# Patient Record
Sex: FEMALE
[De-identification: names, ages, dates, MRNs, and addresses within clinical notes are randomized per-mention and may not be internally consistent; named-entity substitution may affect disease eponyms.]

---

## 2022-03-16 PROBLEM — Z00.00 ENCOUNTER FOR PREVENTIVE HEALTH EXAMINATION: Status: ACTIVE | Noted: 2022-03-16

## 2022-03-18 ENCOUNTER — APPOINTMENT (OUTPATIENT)
Dept: NEUROSURGERY | Facility: CLINIC | Age: 72
End: 2022-03-18
Payer: MEDICARE

## 2022-03-18 VITALS — HEIGHT: 62 IN | WEIGHT: 95 LBS | BODY MASS INDEX: 17.48 KG/M2

## 2022-03-18 PROCEDURE — 99204 OFFICE O/P NEW MOD 45 MIN: CPT

## 2022-03-18 NOTE — ASSESSMENT
[FreeTextEntry1] : I reviewed a recent cervical and thoracic MRI with and without gado. No enhancement. Signal changes from C6 to T-5. 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 Hemosiderin suggested. I discussed at length with pt and her daughter at length about her condition and findings. No neurosurgical intervention recommended at this time. Will need f/u MRI in a few months. Also obtain another opinion from Dr. Bess. They understood our discussion well. Will follow.

## 2022-03-18 NOTE — HISTORY OF PRESENT ILLNESS
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 Made a good recovery until she progressed with walking difficulty and unsteadiness over more than a year now. Also noticed tenderness to bilateral ribs and moderate hypersensitivities in the extremities to palpation, least in the left upper extremity. Ambulating with a cane. Has been undergoing PT for a while now.

## 2022-04-21 ENCOUNTER — APPOINTMENT (OUTPATIENT)
Dept: NEUROSURGERY | Facility: CLINIC | Age: 72
End: 2022-04-21

## 2022-09-16 ENCOUNTER — APPOINTMENT (OUTPATIENT)
Dept: NEUROSURGERY | Facility: CLINIC | Age: 72
End: 2022-09-16

## 2022-11-11 ENCOUNTER — APPOINTMENT (OUTPATIENT)
Dept: NEUROSURGERY | Facility: CLINIC | Age: 72
End: 2022-11-11

## 2022-11-11 PROCEDURE — 99214 OFFICE O/P EST MOD 30 MIN: CPT

## 2022-11-15 NOTE — REASON FOR VISIT
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 She improved with PT over time. I have referred her to see Dr. Bess in March but she didn’t follow through. Over the last few months she reported to me about some balance issue while walking, The balance issue is worse at times. She does use a cane to assist her.

## 2024-09-17 PROBLEM — Z78.9 NON-SMOKER: Status: ACTIVE | Noted: 2024-09-17

## 2024-09-17 PROBLEM — Z87.19 HISTORY OF ESOPHAGITIS: Status: RESOLVED | Noted: 2024-09-17 | Resolved: 2024-09-17

## 2024-09-17 PROBLEM — G95.19: Status: RESOLVED | Noted: 2024-09-17 | Resolved: 2024-09-17

## 2024-09-17 PROBLEM — I60.9 HEMORRHAGE, SUBARACHNOID, NONTRAUMATIC: Status: RESOLVED | Noted: 2024-09-17 | Resolved: 2024-09-17

## 2024-09-17 RX ORDER — LISINOPRIL 30 MG/1
TABLET ORAL
Refills: 0 | Status: DISCONTINUED | COMMUNITY
End: 2024-09-17

## 2024-09-17 RX ORDER — LIDOCAINE 5% 700 MG/1
5 PATCH TOPICAL
Refills: 0 | Status: ACTIVE | COMMUNITY

## 2024-09-17 RX ORDER — AMLODIPINE BESYLATE 5 MG/1
TABLET ORAL
Refills: 0 | Status: ACTIVE | COMMUNITY

## 2024-09-17 RX ORDER — LABETALOL HYDROCHLORIDE 300 MG/1
TABLET, FILM COATED ORAL
Refills: 0 | Status: ACTIVE | COMMUNITY

## 2024-09-17 RX ORDER — IBUPROFEN 400 MG
400 TABLET ORAL
Refills: 0 | Status: ACTIVE | COMMUNITY

## 2024-09-18 ENCOUNTER — NON-APPOINTMENT (OUTPATIENT)
Age: 74
End: 2024-09-18

## 2024-09-19 ENCOUNTER — APPOINTMENT (OUTPATIENT)
Dept: NEUROSURGERY | Facility: CLINIC | Age: 74
End: 2024-09-19
Payer: MEDICARE

## 2024-09-19 ENCOUNTER — NON-APPOINTMENT (OUTPATIENT)
Age: 74
End: 2024-09-19

## 2024-09-19 VITALS
WEIGHT: 96 LBS | HEART RATE: 61 BPM | BODY MASS INDEX: 17.66 KG/M2 | HEIGHT: 62 IN | DIASTOLIC BLOOD PRESSURE: 80 MMHG | SYSTOLIC BLOOD PRESSURE: 131 MMHG | OXYGEN SATURATION: 95 %

## 2024-09-19 DIAGNOSIS — H53.2 DIPLOPIA: ICD-10-CM

## 2024-09-19 DIAGNOSIS — Z78.9 OTHER SPECIFIED HEALTH STATUS: ICD-10-CM

## 2024-09-19 DIAGNOSIS — I60.9 NONTRAUMATIC SUBARACHNOID HEMORRHAGE, UNSPECIFIED: ICD-10-CM

## 2024-09-19 DIAGNOSIS — G95.19 OTHER VASCULAR MYELOPATHIES: ICD-10-CM

## 2024-09-19 DIAGNOSIS — Z87.19 PERSONAL HISTORY OF OTHER DISEASES OF THE DIGESTIVE SYSTEM: ICD-10-CM

## 2024-09-19 DIAGNOSIS — D18.00 HEMANGIOMA UNSPECIFIED SITE: ICD-10-CM

## 2024-09-19 PROCEDURE — 99215 OFFICE O/P EST HI 40 MIN: CPT

## 2024-09-19 NOTE — HISTORY OF PRESENT ILLNESS
[de-identified] : Pavan Chang is a 74-year-old female with a history of a ruptured cavernous angioma 2/2020.  Pt presents with slow short steps that she states is purposeful so that she does not fall as she feels that her balance is worsening.  Pt also reports that her left leg bothers her, right upper extremity pain is worsening.  Pt participates in PT sessions.  Pt endorses numbness on both sides of her back - thoracic area.

## 2024-09-19 NOTE — ASSESSMENT
[FreeTextEntry1] : IMPRESSION:  74-year-old female with a history of a ruptured cavernous angioma 2/2020.   PLAN: 1. Explained to patient that it is likely that her balance and gait issues are related to the bleed that occurred in her spinal cord in 2020. 2. Explained that MRI dated 7/19/24 shows a stable appearance of the spinal cord without any new bleed noted. 3. Explained that there is a chance that there will be a bleed in the future however being that there has only been one occurrence of rupture going ahead with treatment may cause more neurological risks than benefits.  Pt to continue follow up with her doctor. 4. Will discuss at TB. 5. Pt can discuss with her PCP about adding Gabapentin to her regimen. 6. F/U with NSG PRN. 7. No surgical intervention recommended. 4. Continue supportive physical therapy.

## 2024-09-19 NOTE — REVIEW OF SYSTEMS
[Poor Coordination] : poor coordination [Joint Pain] : joint pain [Negative] : Endocrine [Numbness] : numbness

## 2024-09-19 NOTE — DATA REVIEWED
[de-identified] : MRI THORACIC SPINE W/WO DONE AT [facility] ON 7/19/24

## 2024-09-19 NOTE — HISTORY OF PRESENT ILLNESS
[de-identified] : Pavan Chang is a 74-year-old female with a history of a ruptured cavernous angioma 2/2020.  Pt presents with slow short steps that she states is purposeful so that she does not fall as she feels that her balance is worsening.  Pt also reports that her left leg bothers her, right upper extremity pain is worsening.  Pt participates in PT sessions.  Pt endorses numbness on both sides of her back - thoracic area.

## 2024-09-19 NOTE — PHYSICAL EXAM
[General Appearance - Alert] : alert [General Appearance - Well Nourished] : well nourished [General Appearance - Well-Appearing] : healthy appearing [Oriented To Time, Place, And Person] : oriented to person, place, and time [Person] : oriented to person [Place] : oriented to place [Time] : oriented to time [Sclera] : the sclera and conjunctiva were normal [Outer Ear] : the ears and nose were normal in appearance [Neck Appearance] : the appearance of the neck was normal [] : no respiratory distress [Respiration, Rhythm And Depth] : normal respiratory rhythm and effort [Exaggerated Use Of Accessory Muscles For Inspiration] : no accessory muscle use [Heart Rate And Rhythm] : heart rate was normal and rhythm regular [Involuntary Movements] : no involuntary movements were seen [Skin Color & Pigmentation] : normal skin color and pigmentation [FreeTextEntry1] : slow short steps - baby steps

## 2024-09-19 NOTE — HISTORY OF PRESENT ILLNESS
[de-identified] : Pavan Chang is a 74-year-old female with a history of a ruptured cavernous angioma 2/2020.  Pt presents with slow short steps that she states is purposeful so that she does not fall as she feels that her balance is worsening.  Pt also reports that her left leg bothers her, right upper extremity pain is worsening.  Pt participates in PT sessions.  Pt endorses numbness on both sides of her back - thoracic area.

## 2024-09-23 ENCOUNTER — NON-APPOINTMENT (OUTPATIENT)
Age: 74
End: 2024-09-23